# Patient Record
(demographics unavailable — no encounter records)

---

## 2021-10-09 NOTE — EDM.PDOC
ED HPI GENERAL MEDICAL PROBLEM





- General


Chief Complaint: General


Stated Complaint: Sinus Infection?


Time Seen by Provider: 10/09/21 13:30


Source of Information: Reports: Patient


History Limitations: Reports: No Limitations





- History of Present Illness


INITIAL COMMENTS - FREE TEXT/NARRATIVE: 


Pantera is a 19 year old who presents to ER with complaints of increased sinus 

pressure, sore throat, postnasal drainage.  Did have fevers for a short time 

during illness.  Has been ill for the last 2-3 weeks.  Been at college so has 

not been seen as of yet for this.  Mild occasional cough.  Nonproductive.  No 

shortness of breath.  No nausea/vomiting.  No recent exposure to covid that he 

is aware of.  Has been taking decongestants with some relief.  Relates has 

history of sinus infections and feels like one.


Onset: Gradual


Duration: Week(s):, Constant


Location: Reports: Head


Improves with: Reports: Medication


Associated Symptoms: Reports: Cough, Fever/Chills, Headaches.  Denies: 

Confusion, Chest Pain, cough w sputum, Loss of Appetite, Nausea/Vomiting, Short

ness of Breath


Treatments PTA: Reports: Acetaminophen, Other Medication(s) (decongestants)


  ** throat


Pain Score (Numeric/FACES): 5





- Related Data


                                    Allergies











Allergy/AdvReac Type Severity Reaction Status Date / Time


 


No Known Allergies Allergy   Verified 10/09/21 13:21











Home Meds: 


                                    Home Meds





. [No Known Home Meds]  10/09/21 [History]











Past Medical History


HEENT History: Reports: None


Dermatologic History: Reports: Other (See Below)


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





- Infectious Disease History


Infectious Disease History: Reports: MRSA





- Past Surgical History


HEENT Surgical History: Reports: Tonsillectomy





Social & Family History





- Family History


Family Medical History: No Pertinent Family History





- Tobacco Use


Tobacco Use Status *Q: Never Tobacco User


Second Hand Smoke Exposure: No





- Caffeine Use


Caffeine Use: Reports: None





- Recreational Drug Use


Recreational Drug Use: No





ED ROS GENERAL





- Review of Systems


Review Of Systems: See Below


Constitutional: Reports: Fever, Chills, Malaise.  Denies: Decreased Appetite


HEENT: Reports: Rhinitis, Sinus Problem, Throat Pain.  Denies: Ear Pain, Vertigo


Respiratory: Reports: Cough.  Denies: Shortness of Breath


Cardiovascular: Denies: Chest Pain, Edema, Lightheadedness


Endocrine: Reports: Fatigue


GI/Abdominal: Denies: Abdominal Pain, Constipation, Diarrhea, Nausea, Vomiting


: Reports: No Symptoms


Musculoskeletal: Reports: No Symptoms


Skin: Reports: No Symptoms


Neurological: Reports: No Symptoms





ED EXAM, GENERAL





- Physical Exam


Exam: See Below


Exam Limited By: No Limitations


General Appearance: Alert, WD/WN, No Apparent Distress


Ears: Normal External Exam, Normal TMs


Nose: Normal Inspection, Nasal Drainage (mucopurulent rhinorrhea.  Does have 

maxillary sinus tenderness with palpation)


Throat/Mouth: Normal Inspection, Normal Oropharynx


Head: Normocephalic


Neck: Normal Inspection, Supple, Non-Tender


Respiratory/Chest: No Respiratory Distress, Lungs Clear, Normal Breath Sounds


Cardiovascular: Regular Rate, Rhythm


Extremities: Normal Inspection, No Pedal Edema


Neurological: Alert, Oriented


Skin Exam: Warm, Dry





Course





- Vital Signs


Last Recorded V/S: 


                                Last Vital Signs











Temp  97.1 F   10/09/21 13:13


 


Pulse  62   10/09/21 13:13


 


Resp  18   10/09/21 13:13


 


BP  119/75   10/09/21 13:13


 


Pulse Ox  97   10/09/21 13:13














- Orders/Labs/Meds


Labs: 


                                Laboratory Tests











  10/09/21 Range/Units





  13:07 


 


SARS CoV-2 RNA Rapid JONATHAN  Negative  (NEGATIVE)  











Meds: 


Medications














Discontinued Medications














Generic Name Dose Route Start Last Admin





  Trade Name Ronel  PRN Reason Stop Dose Admin


 


Amoxicillin/Clavulanate Potassium  2 packet  10/09/21 13:41 





  Take Home: Amoxicillin/Clavulanate K 875-125 Mg Tab, 2 Tab Pack  PO  10/09/21 

13:42 





  ONETIME ONE  














- Re-Assessments/Exams


Free Text/Narrative Re-Assessment/Exam: 





10/09/21 


covid negative





Departure





- Departure


Time of Disposition: 13:49


Disposition: Home, Self-Care 01


Condition: Good


Clinical Impression: 


 Sinusitis








- Discharge Information


*PRESCRIPTION DRUG MONITORING PROGRAM REVIEWED*: No


*COPY OF PRESCRIPTION DRUG MONITORING REPORT IN PATIENT ANIL: No


Instructions:  Sinusitis, Adult, Easy-to-Read


Referrals: 


PCP,None [Primary Care Provider] - 


Forms:  ED Department Discharge


Additional Instructions: 


1.  Push fluids


2.  Alternate tylenol with ibuprofen for fever or discomfort


3.  Augmentin 875 twice a day for 10 days


4.  Follow up if persisting concerns.  





Sepsis Event Note (ED)





- Evaluation


Sepsis Screening Result: No Definite Risk





- Focused Exam


Vital Signs: 


                                   Vital Signs











  Temp Pulse Resp BP Pulse Ox


 


 10/09/21 13:13  97.1 F  62  18  119/75  97